# Patient Record
Sex: FEMALE | Race: WHITE | NOT HISPANIC OR LATINO | URBAN - NONMETROPOLITAN AREA
[De-identification: names, ages, dates, MRNs, and addresses within clinical notes are randomized per-mention and may not be internally consistent; named-entity substitution may affect disease eponyms.]

---

## 2017-04-28 PROBLEM — H40.052: Noted: 2018-12-13

## 2017-04-28 PROBLEM — H25.812: Noted: 2018-12-13

## 2017-04-28 PROBLEM — H40.039: Noted: 2018-12-13

## 2017-04-28 PROBLEM — H52.4: Noted: 2017-04-28

## 2017-04-28 PROBLEM — H40.1133: Noted: 2018-12-13

## 2017-04-28 PROBLEM — H35.3223: Noted: 2019-01-12

## 2017-04-28 PROBLEM — H35.3112: Noted: 2018-12-13

## 2019-01-11 ENCOUNTER — IMPORTED ENCOUNTER (OUTPATIENT)
Dept: URBAN - NONMETROPOLITAN AREA CLINIC 1 | Facility: CLINIC | Age: 84
End: 2019-01-11

## 2019-01-11 PROCEDURE — 99213 OFFICE O/P EST LOW 20 MIN: CPT

## 2019-01-11 NOTE — PATIENT DISCUSSION
POAG OU Severe-  Discussed findings w/patient-  IOP's slightly lower at 19 25-  Cup/disc ratio 0.4 OD 0.9 OS-  Continue w/o gtts at this time-  Continue to monitor every 3-4 months.-  RTC 3 mo f/u POAGARMD/ERM OU:-  Discussed findings w/patient-  Discontinue AREDS 2 d/t causing stomach issues. -  Continue use of AG daily call or come in ASAP if changes in vision are noted from today. -  noted as stable today. -  will continue to monitor every 3-4 months -  monitor as scheduled or prn; 's Notes: MR 10/10/2018DFE 10/10/2018PACH 12/13/2018 [facility] OCT disc 12/13/201824-2 VF 12/13/2018[place]

## 2019-04-02 ENCOUNTER — IMPORTED ENCOUNTER (OUTPATIENT)
Dept: URBAN - NONMETROPOLITAN AREA CLINIC 1 | Facility: CLINIC | Age: 84
End: 2019-04-02

## 2019-04-02 PROBLEM — H02.88A: Noted: 2019-04-13

## 2019-04-02 PROBLEM — H35.3223: Noted: 2019-04-02

## 2019-04-02 PROBLEM — H40.039: Noted: 2019-04-02

## 2019-04-02 PROBLEM — H02.88B: Noted: 2019-04-13

## 2019-04-02 PROBLEM — H40.039: Noted: 2019-04-13

## 2019-04-02 PROBLEM — H35.3112: Noted: 2019-04-02

## 2019-04-02 PROBLEM — H25.812: Noted: 2019-04-02

## 2019-04-02 PROBLEM — H40.052: Noted: 2019-04-02

## 2019-04-02 PROBLEM — H40.052: Noted: 2019-04-13

## 2019-04-02 PROBLEM — H40.1133: Noted: 2019-04-02

## 2019-04-02 PROBLEM — H40.1133: Noted: 2019-04-13

## 2019-04-02 PROBLEM — H10.022: Noted: 2019-04-02

## 2019-04-02 PROCEDURE — 92012 INTRM OPH EXAM EST PATIENT: CPT

## 2019-04-02 NOTE — PATIENT DISCUSSION
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

## 2019-04-12 ENCOUNTER — IMPORTED ENCOUNTER (OUTPATIENT)
Dept: URBAN - NONMETROPOLITAN AREA CLINIC 1 | Facility: CLINIC | Age: 84
End: 2019-04-12

## 2019-04-12 PROCEDURE — 99213 OFFICE O/P EST LOW 20 MIN: CPT

## 2019-04-12 NOTE — PATIENT DISCUSSION
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

## 2019-05-13 ENCOUNTER — IMPORTED ENCOUNTER (OUTPATIENT)
Dept: URBAN - NONMETROPOLITAN AREA CLINIC 1 | Facility: CLINIC | Age: 84
End: 2019-05-13

## 2019-05-13 PROCEDURE — 99213 OFFICE O/P EST LOW 20 MIN: CPT

## 2019-05-13 NOTE — PATIENT DISCUSSION
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

## 2019-08-19 ENCOUNTER — IMPORTED ENCOUNTER (OUTPATIENT)
Dept: URBAN - NONMETROPOLITAN AREA CLINIC 1 | Facility: CLINIC | Age: 84
End: 2019-08-19

## 2019-08-19 PROCEDURE — 99213 OFFICE O/P EST LOW 20 MIN: CPT

## 2019-08-19 PROCEDURE — 92020 GONIOSCOPY: CPT

## 2019-08-20 PROBLEM — H02.122: Noted: 2019-08-20

## 2019-08-20 PROBLEM — H02.125: Noted: 2019-08-19

## 2019-08-20 PROBLEM — H40.1133: Noted: 2019-08-19

## 2019-08-20 PROBLEM — H10.022: Noted: 2019-08-19

## 2019-08-20 PROBLEM — H35.3131: Noted: 2019-08-19

## 2019-08-20 PROBLEM — H02.88A: Noted: 2019-08-19

## 2019-08-20 PROBLEM — H02.88B: Noted: 2019-08-19

## 2019-11-21 ENCOUNTER — IMPORTED ENCOUNTER (OUTPATIENT)
Dept: URBAN - NONMETROPOLITAN AREA CLINIC 1 | Facility: CLINIC | Age: 84
End: 2019-11-21

## 2019-11-21 PROCEDURE — 92015 DETERMINE REFRACTIVE STATE: CPT

## 2019-11-21 PROCEDURE — 92014 COMPRE OPH EXAM EST PT 1/>: CPT

## 2019-11-21 NOTE — PATIENT DISCUSSION
POAG OU Severe-  Discussed findings w/patient-  IOP's slightly higher at 18 OD and 27 OS -  2-3+ injection noted OS today but patient denies pain at this time. She is scheduled to see Dr. Christiano Ortiz on 12/30 about her glaucoma. Sample of [address] given today to help with redness.-  Cup/disc ratio 0.4/0.4 OD 0.9/0.9 OS-  Gonio done previously: grade 4 w/moderate pigment-  s/p YAG PI OU-  Continue w/o gtts at this time-  Continue to monitor every 3-4 months.-  RTC 3 mo f/u POAG w/ ARMD/ERM OU-  Discussed findings w/patient-  Discontinue AREDS 2 d/t causing stomach issues. -  Continue use of AG daily call or come in ASAP if changes in vision are noted from today.  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

## 2019-12-30 ENCOUNTER — IMPORTED ENCOUNTER (OUTPATIENT)
Dept: URBAN - NONMETROPOLITAN AREA CLINIC 1 | Facility: CLINIC | Age: 84
End: 2019-12-30

## 2019-12-30 PROCEDURE — 92012 INTRM OPH EXAM EST PATIENT: CPT

## 2019-12-30 NOTE — PATIENT DISCUSSION
Ectropion OU -Ectropion (the lower eyelid rolling outward causing lashes to rub against the eye) was explained to the patient. .-This can result in excessive tearing irritation infection scarring and loss of vision.-Treatment options include observation or surgical correction.-Risks and benefits of ectropion repair were discussed and pt elects to proceed. Will schedule at patient's convenience. - Task to Julia to schedule procedure.; 's Notes: MR 10/10/2018DFE 10/10/2018PACH 12/13/2018 [facility] OCT disc 12/13/201824-2 VF 12/13/2018[facility]

## 2019-12-31 PROBLEM — H02.129: Noted: 2019-12-31

## 2019-12-31 PROBLEM — H02.88B: Noted: 2019-12-31

## 2019-12-31 PROBLEM — H10.022: Noted: 2019-12-31

## 2019-12-31 PROBLEM — H35.3131: Noted: 2019-12-31

## 2019-12-31 PROBLEM — H02.88A: Noted: 2019-12-31

## 2019-12-31 PROBLEM — H40.1133: Noted: 2019-12-31

## 2020-11-30 ENCOUNTER — IMPORTED ENCOUNTER (OUTPATIENT)
Dept: URBAN - NONMETROPOLITAN AREA CLINIC 1 | Facility: CLINIC | Age: 85
End: 2020-11-30

## 2020-11-30 PROCEDURE — 92014 COMPRE OPH EXAM EST PT 1/>: CPT

## 2020-11-30 PROCEDURE — 92015 DETERMINE REFRACTIVE STATE: CPT

## 2020-11-30 NOTE — PATIENT DISCUSSION
Mixed Astigmatism OD w/Presbyopia-  discussed findings w/patient-  new spectacle Rx issued-  polycarbonate recommended-  continue to monitor yearly or prnPOAG OU Severe-  Discussed findings w/patient-  IOP's stable at 18 24-  Cup/disc ratio 0.4/0.4 OD 0.9/0.9 OS-  Gonio done previously: grade 4 w/moderate pigment-  s/p YAG PI OU-  Continue w/o gtts at this time-  Continue to monitor every 3-4 months.-  RTC 6 mo f/u POAG w/OCT ON ARMD/ERM OU-  Discussed findings w/patient-  Discontinue AREDS 2 d/t causing stomach issues. -  Continue use of AG daily call or come in ASAP if changes in vision are noted from today.  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-  discussed findings w/patient-  patient had eval w/JS and defers treatment at this time-  continue to monitor as scheduled or prnBlepharitis/MGD OU-  discussed findings w/patient-  stable findings at this time-  continue Avenova daily OU-  RTC as scheduled or prnMacular Scar OU OS>>OD -  discussed diagnosis in detail with patient -  discussed realistic visual acuity recommend one eyed safety precaustions NS OS -  discussed diagnosis in detail with patient -  recommend UV protection -  continue to monitor Pseudophakia OD w/mild PCO-  discussed findings w/patient-  no treatment indicated at this time-  continue to monitor as scheduled or prn; 's Notes: MR 11/30/2020DFE 11/30/2020PACH 12/13/2018 [facility] OCT disc 12/13/201824-2 VF 12/13/2018[facility]

## 2020-12-30 PROBLEM — H02.88B: Noted: 2020-12-30

## 2020-12-30 PROBLEM — H52.4: Noted: 2020-12-30

## 2020-12-30 PROBLEM — H35.3131: Noted: 2020-12-30

## 2020-12-30 PROBLEM — H02.129: Noted: 2020-12-30

## 2020-12-30 PROBLEM — H16.223: Noted: 2020-12-30

## 2020-12-30 PROBLEM — H02.88A: Noted: 2020-12-30

## 2020-12-30 PROBLEM — H40.1133: Noted: 2020-12-30

## 2021-06-01 ENCOUNTER — IMPORTED ENCOUNTER (OUTPATIENT)
Dept: URBAN - NONMETROPOLITAN AREA CLINIC 1 | Facility: CLINIC | Age: 86
End: 2021-06-01

## 2021-06-01 PROBLEM — H52.4: Noted: 2021-06-01

## 2021-06-01 PROBLEM — H35.3131: Noted: 2021-06-01

## 2021-06-01 PROBLEM — H02.88A: Noted: 2021-06-01

## 2021-06-01 PROBLEM — H02.129: Noted: 2021-06-01

## 2021-06-01 PROBLEM — H40.1133: Noted: 2021-06-01

## 2021-06-01 PROBLEM — H16.223: Noted: 2021-06-01

## 2021-06-01 PROBLEM — H02.88B: Noted: 2021-06-01

## 2021-06-01 PROBLEM — H40.1123: Noted: 2021-06-01

## 2021-06-01 PROCEDURE — 99213 OFFICE O/P EST LOW 20 MIN: CPT

## 2021-06-01 PROCEDURE — 92133 CPTRZD OPH DX IMG PST SGM ON: CPT

## 2021-06-01 NOTE — PATIENT DISCUSSION
POAG OU Severe-  Discussed findings w/patient-  IOP's stable at 18 23-  Cup/disc ratio 0.4/0.4 OD 0.9/0.9 OS-  OCT ON done 6/1/2021    OD: 79um 9/10SS normal RNFL all quadrants    OS: 66um 7/10 SS severe sup/inf thinning normal RNFL nasal/temp-  Gonio done previously: grade 4 w/moderate pigment-  s/p YAG PI OU-  Continue w/o gtts at this time-  RTC 6 mo f/u or prnARMD/ERM OU-  Discussed findings w/patient-  Discontinue AREDS 2 d/t causing stomach issues. -  Continue use of AG daily call or come in ASAP if changes in vision are noted from today. 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-  discussed findings w/patient-  patient had eval w/JS and defers treatment at this time-  continue to monitor as scheduled or prnBlepharitis/MGD OU-  discussed findings w/patient-  stable findings at this time-  continue Avenova daily OU-  RTC as scheduled or prnMacular Scar OU OS>>OD -  discussed diagnosis in detail with patient -  discussed realistic visual acuity recommend one eyed safety precaustions NS OS -  discussed diagnosis in detail with patient -  recommend UV protection -  continue to monitor Pseudophakia OD w/mild PCO-  discussed findings w/patient-  no treatment indicated at this time-  continue to monitor as scheduled or prn; 's Notes: MR 11/30/2020DFE 11/30/2020PACH 12/13/2018 [facility] OCT disc 6/1/202146697-9 VF 12/13/2018[facility]

## 2021-12-01 ENCOUNTER — IMPORTED ENCOUNTER (OUTPATIENT)
Dept: URBAN - NONMETROPOLITAN AREA CLINIC 1 | Facility: CLINIC | Age: 86
End: 2021-12-01

## 2021-12-01 PROCEDURE — 92014 COMPRE OPH EXAM EST PT 1/>: CPT

## 2021-12-01 PROCEDURE — 92134 CPTRZ OPH DX IMG PST SGM RTA: CPT

## 2021-12-01 NOTE — PATIENT DISCUSSION
POAG OU Severe-  Discussed findings w/patient-  IOP's stable at 16 22-  Cup/disc ratio 0.4/0.4 OD 0.9/0.9 OS-  OCT ON    OD: 79um 9/10SS normal RNFL all quadrants    OS: 66um 7/10 SS severe sup/inf thinning normal RNFL nasal/temp-  Gonio done previously: grade 4 w/moderate pigment-  s/p YAG PI OU-  Continue w/o gtts at this time-  RTC 6 mo f/u or prnARMD/ERM OU-  Discussed findings w/patient-  Discontinue AREDS 2 d/t causing stomach issues. -  Continue use of AG daily call or come in ASAP if changes in vision are noted from today.  -  OCT done today: stable OU -  continue to monitorEctropion OS>>OD-  discussed findings w/patient-  patient had eval w/JS and defers treatment at this time-  continue to monitor as scheduled or prnBlepharitis/MGD OU-  discussed findings w/patient-  stable findings at this time-  continue Avenova daily OU-  RTC as scheduled or prnMacular Scar OU OS>>OD -  discussed diagnosis in detail with patient -  discussed realistic visual acuity recommend one eyed safety precaustions NS OS -  discussed diagnosis in detail with patient -  recommend UV protection -  continue to monitor Pseudophakia OD w/mild PCO-  discussed findings w/patient-  no treatment indicated at this time-  continue to monitor as scheduled or prn; 's Notes: MR deferred 12/1/2021DFE 12/1/2021PACH 12/13/2018 [facility] OCT Mac 12/2/2021OCT disc 6/1/202567032-7 VF 12/13/2018[facility]

## 2022-04-09 ASSESSMENT — VISUAL ACUITY
OD_SC: 20/40
OD_SC: 20/40
OD_SC: 20/30
OD_SC: 20/40-2
OU_CC: 20/30
OD_SC: 20/30-2
OD_CC: 20/40+2
OD_SC: 20/30-1
OD_PH: 20/40
OD_SC: 20/30
OU_CC: 20/40
OD_SC: 20/30-2
OS_SC: 20/HM
OU_SC: 20/30
OU_CC: 20/30
OD_SC: 20/30
OU_SC: 20/30-2

## 2022-04-09 ASSESSMENT — TONOMETRY
OS_IOP_MMHG: 26
OD_IOP_MMHG: 18
OS_IOP_MMHG: 25
OS_IOP_MMHG: 26
OD_IOP_MMHG: 20
OS_IOP_MMHG: 27
OD_IOP_MMHG: 16
OD_IOP_MMHG: 18
OD_IOP_MMHG: 20
OD_IOP_MMHG: 18
OS_IOP_MMHG: 27
OS_IOP_MMHG: 26
OS_IOP_MMHG: 24
OD_IOP_MMHG: 20
OS_IOP_MMHG: 22
OD_IOP_MMHG: 19
OD_IOP_MMHG: 19
OS_IOP_MMHG: 23

## 2022-06-01 ENCOUNTER — DIAGNOSTICS ONLY (OUTPATIENT)
Dept: URBAN - NONMETROPOLITAN AREA CLINIC 4 | Facility: CLINIC | Age: 87
End: 2022-06-01

## 2022-06-01 NOTE — PATIENT DISCUSSION
POAG OU Severe-  Discussed findings w/patient-  IOP's stable at 16 22-  Cup/disc ratio 0.4/0.4 OD 0.9/0.9 OS-  OCT ON    OD: 79um 9/10SS normal RNFL all quadrants    OS: 66um 7/10 SS severe sup/inf thinning normal RNFL nasal/temp-  Gonio done previously: grade 4 w/moderate pigment-  s/p YAG PI OU-  Continue w/o gtts at this time-  RTC 6 mo f/u or prnARMD/ERM OU-  Discussed findings w/patient-  Discontinue AREDS 2 d/t causing stomach issues. -  Continue use of AG daily call or come in ASAP if changes in vision are noted from today. -  OCT done today: stable OU -  continue to monitorEctropion OS>>OD-  discussed findings w/patient-  patient had eval w/JS and defers treatment at this time-  continue to monitor as scheduled or prnBlepharitis/MGD OU-  discussed findings w/patient-  stable findings at this time-  continue Avenova daily OU-  RTC as scheduled or prnMacular Scar OU OS>>OD -  discussed diagnosis in detail with patient -  discussed realistic visual acuity recommend one eyed safety precaustions NS OS -  discussed diagnosis in detail with patient -  recommend UV protection -  continue to monitor Pseudophakia OD w/mild PCO-  discussed findings w/patient-  no treatment indicated at this time-  continue to monitor as scheduled or prn; Dr's Notes: MR deferred 12/1/2021DFE 12/1/2021PACH 12/13/2018 [facility] OCT Mac 12/2/2021OCT disc 6/1/202320855-8 VF 12/13/2018G[place]imelissa.

## 2022-07-12 ENCOUNTER — FOLLOW UP (OUTPATIENT)
Dept: URBAN - NONMETROPOLITAN AREA CLINIC 4 | Facility: CLINIC | Age: 87
End: 2022-07-12

## 2022-07-12 DIAGNOSIS — H35.3131: ICD-10-CM

## 2022-07-12 DIAGNOSIS — H40.1123: ICD-10-CM

## 2022-07-12 PROCEDURE — 92014 COMPRE OPH EXAM EST PT 1/>: CPT

## 2022-07-12 RX ORDER — LATANOPROST 50 UG/ML: 1 SOLUTION/ DROPS OPHTHALMIC EVERY EVENING

## 2022-07-12 ASSESSMENT — TONOMETRY
OD_IOP_MMHG: 18
OS_IOP_MMHG: 22

## 2022-07-12 ASSESSMENT — VISUAL ACUITY
OD_CC: 20/40-2
OS_CC: CF 2FT

## 2022-07-12 NOTE — PATIENT DISCUSSION
POAG OU Severe-  Discussed findings w/patient-  IOP's stable at 16 22-  Cup/disc ratio 0.4/0.4 OD 0.9/0.9 OS-  OCT ON    OD: 79um 9/10SS normal RNFL all quadrants    OS: 66um 7/10 SS severe sup/inf thinning normal RNFL nasal/temp-  Gonio done previously: grade 4 w/moderate pigment-  s/p YAG PI OU-  Continue w/o gtts at this time-  RTC 6 mo f/u or prnARMD/ERM OU-  Discussed findings w/patient-  Discontinue AREDS 2 d/t causing stomach issues. -  Continue use of AG daily call or come in ASAP if changes in vision are noted from today. -  OCT done today: stable OU -  continue to monitorEctropion OS>>OD-  discussed findings w/patient-  patient had eval w/JS and defers treatment at this time-  continue to monitor as scheduled or prnBlepharitis/MGD OU-  discussed findings w/patient-  stable findings at this time-  continue Avenova daily OU-  RTC as scheduled or prnMacular Scar OU OS>>OD -  discussed diagnosis in detail with patient -  discussed realistic visual acuity recommend one eyed safety precaustions NS OS -  discussed diagnosis in detail with patient -  recommend UV protection -  continue to monitor Pseudophakia OD w/mild PCO-  discussed findings w/patient-  no treatment indicated at this time-  continue to monitor as scheduled or prn; Dr's Notes: MR deferred 12/1/2021DFE 12/1/2021PACH 12/13/2018 [facility] OCT Mac 12/2/2021OCT disc 6/1/202003980-9 VF 12/13/2018[place]imelissa.